# Patient Record
Sex: FEMALE | Race: WHITE | NOT HISPANIC OR LATINO | Employment: FULL TIME | URBAN - METROPOLITAN AREA
[De-identification: names, ages, dates, MRNs, and addresses within clinical notes are randomized per-mention and may not be internally consistent; named-entity substitution may affect disease eponyms.]

---

## 2017-07-28 PROCEDURE — 87591 N.GONORRHOEAE DNA AMP PROB: CPT | Performed by: NURSE PRACTITIONER

## 2017-07-28 PROCEDURE — 87491 CHLMYD TRACH DNA AMP PROBE: CPT | Performed by: NURSE PRACTITIONER

## 2017-07-28 PROCEDURE — G0145 SCR C/V CYTO,THINLAYER,RESCR: HCPCS | Performed by: NURSE PRACTITIONER

## 2017-08-03 ENCOUNTER — LAB REQUISITION (OUTPATIENT)
Dept: LAB | Facility: HOSPITAL | Age: 22
End: 2017-08-03
Payer: COMMERCIAL

## 2017-08-03 DIAGNOSIS — Z01.419 ENCOUNTER FOR GYNECOLOGICAL EXAMINATION WITHOUT ABNORMAL FINDING: ICD-10-CM

## 2017-08-08 LAB
CHLAMYDIA DNA CVX QL NAA+PROBE: NORMAL
N GONORRHOEA DNA GENITAL QL NAA+PROBE: NORMAL

## 2017-08-09 LAB
LAB AP GYN PRIMARY INTERPRETATION: NORMAL
LAB AP LMP: NORMAL
Lab: NORMAL

## 2018-01-15 NOTE — MISCELLANEOUS
Message  patient called stating the rash at the back of her hands has improved, however for the past 2 days it has spread to her arms and elbows  The rash is similar to what it was on her hands  She has applied triamcinolone, she feels is not helping  The rash is itchy  Patient is currently on OCP  No recent sexual activity  I have advised patient to take Allegra or Zyrtec in the morning and Benadryl at night  Patient states she is unable to come in Monday, and she is going to [place] and leaving for [place] to join [employer] on Tuesday  I will call in Medrol Dosepak    If rash persists, i have advised patient to go to the ER      Plan  Skin rash    · Medrol (Mesfin) 4 MG Oral Tablet (MethylPREDNISolone (Mesfin)); TAKE AS DIRECTED  ON PATIENT INSTRUCTION CARD    Signatures   Electronically signed by : Kamala Robles MD; Feb 6 2016  1:59PM EST                       (Author)

## 2018-01-16 NOTE — PROGRESS NOTES
Assessment    1  Skin rash (782 1) (R21)    Plan  Pharyngitis    · Amoxicillin 500 MG Oral Capsule  Skin rash    · Clotrimazole-Betamethasone 1-0 05 % External Cream; APPLY sparingly to  affected skin at left forearm twice daily for up to two  weeks   · Triamcinolone Acetonide 0 025 % External Ointment; APPLY 2-3 TIMES DAILY TO  DORSAL ASPECT OF HANDS    Discussion/Summary    24 y o f p/w a 1 month h/o rash/dermatitis over dorsal aspect of hands B/L likely 2/2 eczema  i recommended pt use either cetaphil, aquaphor or eurcerin  She may also sleep with cotton mittens at night  I will call in triamcinolone to apply over these areas  Re  circular lesion over left forearm, I will call in clotrimazole-betamethasone as I feel there may be a fungal component to this  Pt states she does go to the gym  If no improvement, will refer to derm  The patient was counseled regarding diagnostic results, instructions for management, risk factor reductions, prognosis, patient and family education, impressions, risks and benefits of treatment options, importance of compliance with treatment  Possible side effects of new medications were reviewed with the patient/guardian today  The treatment plan was reviewed with the patient/guardian  The patient/guardian understands and agrees with the treatment plan      Chief Complaint  rash on both hands for one month , itchy and red used otc moisturizer cream , no pain , no fever      History of Present Illness  HPI: 24 y o f p/w rash that has been itchy over the back of her hands for the past 1 month  In addition, has noted a round ring over left forearm  applied daily moisturizer cocoa butter   Denies using any new products   Rash (Brief): The patient is being seen for a rash   Symptoms: rash, localized rash and pruritus, but no fever  Associated Symptoms: no arthralgias  Review of Systems    Constitutional: no fever  Integumentary: rash, itching and dry skin        Active Problems 1  Hearing Loss (389 9)   2  Pharyngitis (462) (J02 9)   3  Scoliosis (737 30) (M41 9)   4  Sore throat (462) (J02 9)    Past Medical History    1  History of Common wart (078 19) (B07 8)   2  History of Hearing Loss (389 9)   3  History of acute pharyngitis (V12 69) (Z87 09)   4  History of upper respiratory infection (V12 09) (Z87 09)    Social History    · Never A Smoker   · Never Drank Alcohol    Surgical History    1  History of Destruction Of Flat Warts By Cryosurgery    Current Meds   1  Amoxicillin 500 MG Oral Capsule; TAKE one CAPSULE Twice daily for ten days; Therapy: [id number] to (Last Rx:[id number])  Requested for: [id number] Ordered   2  No Reported Medications Recorded    Allergies    1  No Known Drug Allergies    Vitals   Recorded: [id number] 09:41AM   Temperature 97 F   Heart Rate 76   Respiration 14   Systolic 818   Diastolic 66   Height 5 ft 6 3 in   Weight 157 lb 6 08 oz   BMI Calculated 25 17   BSA Calculated 1 81     Physical Exam    Constitutional   General appearance: No acute distress, well appearing and well nourished  Ears, Nose, Mouth, and Throat   Oropharynx: Normal with no erythema, edema, exudate or lesions  Pulmonary   Respiratory effort: No increased work of breathing or signs of respiratory distress  Auscultation of lungs: Clear to auscultation  Cardiovascular   Auscultation of heart: Normal rate and rhythm, normal S1 and S2, without murmurs  Lymphatic   Palpation of lymph nodes in neck: No lymphadenopathy  Skin   Skin and subcutaneous tissue: Abnormal   erythematous, scaly maculopapular lesion sporadically over dorsal aspect of hands  There is also a circular flat lesion over left ant forearm that is ~ 1 cm, all are blanchable          Signatures   Electronically signed by : Naveen Samuels MD; Jan 27 2016  3:59PM EST                       (Author)